# Patient Record
(demographics unavailable — no encounter records)

---

## 2025-02-26 NOTE — LETTER BODY
[Dear  ___] : Dear  [unfilled], [Consult Letter:] : I had the pleasure of evaluating your patient, [unfilled]. [Please see my note below.] : Please see my note below. [Consult Closing:] : Thank you very much for allowing me to participate in the care of this patient.  If you have any questions, please do not hesitate to contact me. [Sincerely,] : Sincerely, [FreeTextEntry3] : Janusz

## 2025-02-26 NOTE — PHYSICAL EXAM
[Normal Appearance] : normal appearance [Well Groomed] : well groomed [General Appearance - In No Acute Distress] : no acute distress [Edema] : no peripheral edema [Respiration, Rhythm And Depth] : normal respiratory rhythm and effort [Exaggerated Use Of Accessory Muscles For Inspiration] : no accessory muscle use [Abdomen Soft] : soft [Abdomen Tenderness] : non-tender [Costovertebral Angle Tenderness] : no ~M costovertebral angle tenderness [Urethral Meatus] : normal urethra [Urinary Bladder Findings] : the bladder was normal on palpation [External Female Genitalia] : normal external genitalia [Normal Station and Gait] : the gait and station were normal for the patient's age [] : no rash [No Focal Deficits] : no focal deficits [Oriented To Time, Place, And Person] : oriented to person, place, and time [Affect] : the affect was normal [Mood] : the mood was normal [No Palpable Adenopathy] : no palpable adenopathy [de-identified] : no urethral hypermobility

## 2025-02-26 NOTE — HISTORY OF PRESENT ILLNESS
[FreeTextEntry1] : This is a 56-year-old  female with history of urinary urgency, frequency and sensation of incomplete bladder emptying since treatment for UTI in November.  Patient is similar episode many years ago.  Currently she denies dysuria, gross hematuria, stress or urge incontinence.  She has nocturia x 2.  Recent urinalysis was unremarkable.  Patient does not smoke.

## 2025-02-26 NOTE — ASSESSMENT
[FreeTextEntry1] : Patient has a clinical picture of overactive bladder, likely secondary to recent urinary tract infection.  We did send her urines for repeat analysis, culture and cytology and we will place her on solifenacin, 5 mg daily.  We will reevaluate her again in 3 months; if she is not clinically improved, we will schedule for formal evaluation with urodynamic studies and cystoscopy.

## 2025-05-28 NOTE — ASSESSMENT
[FreeTextEntry1] : Patient is stable clinically with improved voiding symptoms; she is voiding adequately with excellent bladder emptying. She will continue with daily solifinacin. I encouraged her to maintain adequate fluid intake. If all remains stable, she will follow-up in 6 months; we discussed that she may discontinue medications at that time.

## 2025-05-28 NOTE — ADDENDUM
[FreeTextEntry1] : Next visit: U/A,U/C, PVR  Entered by Janelle Venegas, acting as scribe and chaperone for Dr. Janusz Crawford.   The documentation recorded by the scribe accurately reflects the service I personally performed and the decisions made by me.

## 2025-05-28 NOTE — HISTORY OF PRESENT ILLNESS
[FreeTextEntry1] : Patient comes in with improved voiding symptoms. She is voiding with an adequate stream with nocturia x 0-1, no flank pain, dysuria or hematuria. She reports being able to hold her urine for 2-3 hours with less urgency. She reports frequency in the morning and after drinking coffee. Patient continues to take solifenacin; she reports some associated dry mouth, denying constipation.

## 2025-05-28 NOTE — LETTER BODY
[Dear  ___] : Dear  [unfilled], [Courtesy Letter:] : I had the pleasure of seeing your patient, [unfilled], in my office today. [Please see my note below.] : Please see my note below. [Consult Closing:] : Thank you very much for allowing me to participate in the care of this patient.  If you have any questions, please do not hesitate to contact me. [Sincerely,] : Sincerely, [FreeTextEntry3] : Janusz